# Patient Record
(demographics unavailable — no encounter records)

---

## 2024-12-09 NOTE — IMAGING
[Left] : left shoulder [FreeTextEntry1] : 2V: The GH joint is ok. There are minor AC changes.  [FreeTextEntry5] : 2V: There is a type I - II acromion.

## 2024-12-09 NOTE — HISTORY OF PRESENT ILLNESS
[Full time] : Work status: full time [de-identified] : Pain left shoulder 8 -10weeks saw PCP had MRI @ [facility] [] : Post Surgical Visit: no [de-identified] : [facility]

## 2024-12-09 NOTE — PHYSICAL EXAM
[Left] : left shoulder [Sitting] : sitting [Moderate] : moderate [5 ___] : forward flexion 5[unfilled]/5 [5___] : external rotation 5[unfilled]/5 [] : no sensory deficits [Right] : right shoulder [FreeTextEntry8] : This is slight.  [FreeTextEntry3] : 160/60/L3 [TWNoteComboBox4] : passive forward flexion 160 degrees [TWNoteComboBox6] : internal rotation L4 [de-identified] : external rotation 45 degrees

## 2024-12-09 NOTE — CONSULT LETTER
[Dear  ___] : Dear  [unfilled], [Consult Letter:] : I had the pleasure of evaluating your patient, [unfilled]. [Please see my note below.] : Please see my note below. [Consult Closing:] : Thank you very much for allowing me to participate in the care of this patient.  If you have any questions, please do not hesitate to contact me. [Sincerely,] : Sincerely, [FreeTextEntry3] : Jesus Leiva M.D. Shoulder Surgery

## 2024-12-09 NOTE — ASSESSMENT
[FreeTextEntry1] : We discussed the underlying pathology. Treatment options reviewed. Medication use discussed. MDP is prescribed.  PT is planned.  If symptoms persist, an injection is an option. He will follow up in 4 weeks.  Cautions discussed. Questions answered.  Patient seen by Dr. Jesus Leiva, who determined the assessment and plan. Corine LONDONO, am scribing for Dr. Jesus Leiva in his presence for the chief complaint, physical exam, studies, assessment, and/or plan.

## 2024-12-09 NOTE — DATA REVIEWED
[FreeTextEntry1] : LEFT SHOULDER MRI ([facility]) 11/26/24:  I reviewed the images, and my interpretation is that there are minor subscapularis changes. There is biceps fluid. There is a SLAP tear. The AC joint is ok. The muscle is good.

## 2024-12-09 NOTE — REASON FOR VISIT
[FreeTextEntry2] : This is a 54 year old D M  with left shoulder pain since late October 2024.  No injury or prior history.  Reaching and lifting are affected.  It can wake him at night.  No numbness.  OTC medications have not been sufficient. An MRI was done 11/26/24.

## 2025-02-03 NOTE — ASSESSMENT
[FreeTextEntry1] : We discussed his options. An injection is planned today.  We discussed PT. He has elected for a home program.  He will follow up in 6 weeks.  Questions answered.   Patient seen by Dr. Jesus Leiva, who determined the assessment and plan. Annetta PACE participated in the care of the patient, including the history and physical exam.  Procedure Name: Large Joint Injection / Aspiration: Depomedrol, Lidocaine and Guidance Ultrasound   Large Joint Injection was performed because of pain and inflammation. Depomedrol: An injection of Depomedrol 40 mg , 2 cc. Lidocaine: An injection of Lidocaine 1 mg , 13 cc.   Medication was injected in the left subacromial space and glenohumeral joint from a posterior approach. Patient has tried OTC's including aspirin, Ibuprofen, Aleve etc or prescription NSAIDS, and/or exercises at home and/ or physical therapy without satisfactory response. The risks, benefits, and alternatives to steroid injection were explained in full to the patient. Risks outlined include but are not limited to infection, sepsis, bleeding, scarring, skin discoloration, temporary increase in pain, syncopal episode, failure to resolve symptoms, allergic reaction, symptom recurrence, and elevation of blood sugar in diabetics. Patient understood the risks. All questions were answered. After discussion, patient requested an injection. Oral informed consent was obtained.  Sterile preparation with betadine and aseptic technique was utilized for the procedure, including the preparation of the solutions used for the injection. Patient tolerated the procedure well.  Post Procedure Instructions: Patient was advised to call if redness, pain, or fever occur and apply ice for 15 min. out of every hour for the next 12-24 hours as tolerated. Patient was advised to rest the joint(s) for 3 days.  Advised to ice the injection site this evening. Ultrasound Guidance was used for the following reasons: for precise injection in area of tear. Visualization of the needle and placement of injection was performed without complication.

## 2025-02-03 NOTE — REASON FOR VISIT
[FreeTextEntry2] : This is a 54 year old D M  with left shoulder pain since late October 2024.  No injury or prior history.  No numbness.  OTC medications have not been sufficient. An MRI was done 11/26/24. The PT has helped, though his symptoms have begun to worsen without injury.  There is pain with night and reaching.

## 2025-02-03 NOTE — HISTORY OF PRESENT ILLNESS
[7] : 7 [2] : 2 [Dull/Aching] : dull/aching [Sharp] : sharp [Full time] : Work status: full time [de-identified] : 2/3/25: Patient is here for a follow up of the left shoulder. Pain was getting better since last time he was here but has recently been getting worse. Physical therapy in the past did help.  [] : Post Surgical Visit: no [de-identified] : pt [de-identified] : [facility]

## 2025-02-03 NOTE — PHYSICAL EXAM
[Left] : left shoulder [Sitting] : sitting [Moderate] : moderate [5 ___] : forward flexion 5[unfilled]/5 [5___] : external rotation 5[unfilled]/5 [Right] : right shoulder [] : no sensory deficits [FreeTextEntry8] : This is slight.  [FreeTextEntry3] : 160/60/L3 [TWNoteComboBox4] : passive forward flexion 160 degrees [TWNoteComboBox6] : internal rotation L4 [de-identified] : external rotation 45 degrees

## 2025-03-13 NOTE — DISCUSSION/SUMMARY
[de-identified] : Assessment: 54-year-old male with a left calf strain  Plan: I had a long discussion with the patient today regarding the nature of their diagnosis and treatment plan. We discussed the risks and benefits of no treatment as well as nonoperative and operative treatments.  I reviewed the patient's MRI which demonstrates findings consistent with a calf strain.  On examination he has pain about the myotendinous junction of the medial gastrocnemius and medial gastrocnemius muscle belly.  He otherwise has good range of motion and strength and clinically there is no concern for any Achilles injury.  At this time I recommend conservative treatment of the patient's condition with modalities including rest, ice, heat, anti-inflammatory medications, activity modifications, and home stretching and strengthening exercises. I discussed with the patient the risks and benefits associated with NSAID use. GI precautions were discussed.  A referral for physical therapy was provided to begin working on exercises to help improve their strength and function.  He will remain out of pickleball for at least 3 to 5 weeks and may gradually increase his activity thereafter.  Recommend follow-up in 8 weeks repeat clinical assessment as needed.  The patient verbalizes their understanding and agrees with the plan.  All questions were answered to their satisfaction.   I, Dr. Carrington, personally performed the evaluation and management (E/M) services for this new patient.  That E/M includes conducting the clinically appropriate initial history &/or exam, assessing all conditions, and establishing the plan of care.  Today, my MARA, was here to observe my evaluation and management service for this patient & follow plan of care established by me going forward.

## 2025-03-13 NOTE — HISTORY OF PRESENT ILLNESS
[de-identified] : DOI: 02/23/2025  3/13/2025: Harry is a pleasant 54-year-old male who presents to the office today for evaluation of a left calf strain.  The patient states that about 3 weeks ago his playing pickle ball when he felt a pulling sensation in his left calf.  He had immediate pain and subsequent swelling with difficulty walking for several days because of the pain.  Since that time he is feeling better with symptomatic care on his own.  He did have an MRI of the leg and was referred to me for specialist opinion.  He denies any fevers, chills, sweats, or pain elsewhere.  He has had no formal treatment.

## 2025-03-13 NOTE — PHYSICAL EXAM
[de-identified] : General: Awake, alert, no acute distress, Patient was cooperative and appropriate during the examination.  Walks without an antalgic gait.   Left lower ankle Examination: Physical examination of the lower extremity demonstrates normal skin without signs of skin changes or abnormalities. No erythema, warmth, or joint effusion is appreciated.  There is mild soft tissue swelling about the mid leg down towards the foot with ecchymosis about the medial aspect of the ankle.  Sensation is intact to light touch L2-S1 Palpable DP/PT pulse EHL/FHL/TA/GSC motor function intact  Ankle Range of Motion: Dorsiflexion 20 degrees Plantarflexion 40 degrees Inversion 30 degrees Eversion 20 degrees  Knee range of motion: 0 to 130 degrees  Strength Testing Dorsiflexion 5/5 Plantarflexion 5/5 Inversion 5/5 Eversion 5/5  Palpation Not tender to palpation over the lateral malleolus Not tender to palpation over the medial malleolus Moderately tender to palpation about the myotendinous junction of the medial gastrocnemius without palpable defect Not tender to palpation over the ATFL, CFL, PTFL Not tender to palpation over the calcaneal tuberosity Not tender to palpation over the peroneal tendons Not tender to palpation over the tarsals, metatarsals, or phalanges No palpable defect within the achilles tendon Not tender to palpation about the knee  Special Tests: Ankle anterior drawer negative Squeeze test negative Davis's test negative [de-identified] : MRI of the left lower extremity from a [facility] on 2/27/2025 was reviewed the patient today in the office: 1.  Partial-thickness tear of the medial head of the gastrocnemius muscle with associated vertical myofascial tear extending superiorly along the anterior surface of the muscle. 2. Small hematoma interposed between the medial gastrocnemius and soleus muscles.

## 2025-03-13 NOTE — REASON FOR VISIT
[Initial Visit] : an initial visit for [FreeTextEntry2] : left lower leg injury DOI: 02/23/2025 EOMI; [facility]; no drainage or redness